# Patient Record
Sex: MALE | Race: WHITE | NOT HISPANIC OR LATINO
[De-identification: names, ages, dates, MRNs, and addresses within clinical notes are randomized per-mention and may not be internally consistent; named-entity substitution may affect disease eponyms.]

---

## 2023-03-30 PROBLEM — Z00.00 ENCOUNTER FOR PREVENTIVE HEALTH EXAMINATION: Status: ACTIVE | Noted: 2023-03-30

## 2023-03-30 PROBLEM — Z00.00 ENCOUNTER FOR PREVENTIVE HEALTH EXAMINATION: Noted: 2023-03-30

## 2023-03-31 ENCOUNTER — APPOINTMENT (OUTPATIENT)
Dept: ORTHOPEDIC SURGERY | Facility: CLINIC | Age: 48
End: 2023-03-31
Payer: OTHER MISCELLANEOUS

## 2023-03-31 VITALS — HEIGHT: 72 IN | WEIGHT: 220 LBS | BODY MASS INDEX: 29.8 KG/M2

## 2023-03-31 DIAGNOSIS — Z78.9 OTHER SPECIFIED HEALTH STATUS: ICD-10-CM

## 2023-03-31 DIAGNOSIS — J45.909 UNSPECIFIED ASTHMA, UNCOMPLICATED: ICD-10-CM

## 2023-03-31 DIAGNOSIS — M51.26 OTHER INTERVERTEBRAL DISC DISPLACEMENT, LUMBAR REGION: ICD-10-CM

## 2023-03-31 DIAGNOSIS — M54.16 RADICULOPATHY, LUMBAR REGION: ICD-10-CM

## 2023-03-31 PROCEDURE — 99204 OFFICE O/P NEW MOD 45 MIN: CPT

## 2023-03-31 RX ORDER — IBUPROFEN 800 MG/1
800 TABLET, FILM COATED ORAL 3 TIMES DAILY
Qty: 90 | Refills: 1 | Status: ACTIVE | COMMUNITY
Start: 2023-03-31 | End: 1900-01-01

## 2023-03-31 NOTE — DATA REVIEWED
[MRI] : MRI [Lumbar Spine] : lumbar spine [Report was reviewed and noted in the chart] : The report was reviewed and noted in the chart [I independently reviewed and interpreted images and report] : I independently reviewed and interpreted images and report [I reviewed the films/CD and additionally noted] : I reviewed the films/CD and additionally noted [FreeTextEntry1] : 03/29/2023 MRI lumbar spine [facility] imagin\par \par L4-L5: Disc bulge with left foraminal-lateral annular tear, impinging upon\par the thecal sac, resulting in left neural foramen stenosis with facet\par arthrosis.\par L5-S1: Aforementioned retrolisthesis with an extruded central-right\par paracentral disc herniation superimposed upon a disc bulge, demonstrating\par approximately 0.5 cm inferior migration of disc material, partially effacing\par the ventral epidural fat, resulting in right lateral recess and right neural\par foramen stenosis with facet arthrosis, impinging upon the right S1 nerve\par roots.

## 2023-03-31 NOTE — HISTORY OF PRESENT ILLNESS
[de-identified] : 03/31/2023 - Patient is a 48-year-old male presenting with lumbar back pain radiating to his posterior right leg stopping at the right calf. Patient states this is part of a work injury from November 2022. 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  Patient denies significant back injury prior to this incident.  Patient responded well to conservative care in the past about one year ago, including chiropractic care physical therapy, and pain management. Over the past three weeks patient had a recent back pain flare up.  His pain is predominantly in his right posterior leg radiating down to his right calf. Patient proceeded to chiropractic care with no relief. Patient then proceeded to pain management for TPI and epidurals with minimal to no relief. Patient went to the ER two days ago at [facility]. Patient had MRI done. Patient was given pain meds, IV steroid, and muscle relaxer.  In today’s visit his pain level is tolerable is improved since his ER visit would like to try physical therapy.  He denies saddle numbness, b/b dysfunction, loss of function or intractable pain at this point.  He has not needed to take pain meds he is on a Medrol dose pack.\par \par \par DOI: 11/16/2022\par Occupation [employer]\par Status OOW x 3 weeks \par \par \par \par The patient is a 48 year old , R dominant hand male who presents today complaining of lower back,  \par Date of Injury/Onset: 11/06/2022.\par Pain:    At Rest: 3/10  \par With Activity:  5-6/10  \par Mechanism of injury:  No specific cause of injury. Pt states some overuse due to his job type.\par Quality of symptoms: Achy, some numbness in the right foot, radiates down to the calf.\par This is a Worker's Compensation Injury\par Improves with: Tylenol\par Worse with: Sitting, sleeping, standing \par Prior treatment/ Imaging: CSI ~ 2 weeks ago, Epidural injection on the following week\par Out of work: ;Since: \par School/Sport/Position/Occupation: [employer]\par Additional Information: Pt was in the hospital ([facility]) 2 days ago with severe spasms \par \par

## 2023-03-31 NOTE — PHYSICAL EXAM
[Flexion] : flexion [Extension] : extension [] : mildly antalgic [de-identified] : Constitutional:  \par - No acute distress  \par - Well developed; well nourished  \par   \par Neurological:  \par - normal mood and affect  \par - alert and oriented x 3   \par   \par Cardiovascular:  \par - grossly normal\par   \par \par Lumbar Spine Exam: \par \par Inspection: \par erythema (-) \par ecchymosis (-) \par rashes (-) \par alignment: no scoliosis \par   \par Palpation: \par Midline lumbar tenderness:             (-) \par midline thoracic tenderness:          (-) \par Lumbar paraspinal tenderness:  	L (-) ; R (+) \par thoracic paraspinal tenderness:	L (-) ; R (-) \par sciatic nerve tenderness :         	L (-) ; R (-) \par SI joint tenderness:                    	L (-) ; R (-) \par GTB tenderness:                        	L (-);  R (-) \par   \par ROM:   stifness in all planes\par pain with flexion in his right posterior leg\par   \par Strength: \par                                	Right   	Left    \par Hip Flexion:                (5/5)       (5/5) \par Quadriceps:               (5/5)       (5/5) \par Hamstrings:                (5/5)       (5/5) \par Ankle Dorsiflexion:    (5/5)       (5/5) \par EHL:                            (5/5)       (5/5) \par Ankle Plantarflexion:  (5/5)       (5/5) \par   \par Special Tests: \par SLR:                        	R (+) ; L (-) \par Facet loading:        	R (-) ; L (-) \par JULIUS test:            	R (-) ; L (-) \par Hamstring tightness:  R (-);  L (-) \par   \par Neurologic: \par SILT throughout right lower extremity \par SILT throughout left lower extremity \par   \par Reflexes normal and symmetric bilateral lower extremities \par   \par Gait: \par non- antalgic gait \par ambulates without assistive device \par \par

## 2023-03-31 NOTE — ASSESSMENT
[FreeTextEntry1] : Patient is a 48-year-old male with a disc herniation at L5 S1 on the right side causing S1 nerve compression.  Patient responded well to conservative measures about a year ago for the same disc herniation.  Patient new MRI has imaging from one year ago which shows the same disc herniation MRI from two days ago does not show significant change.  Patient can try physical therapy given his pain level is adequate at this point.  Patient can be a possible L5-S1 microdiscectomy  candidate if he fails conservative measures.  Patient reports he has taken Ibuprofen without reaction in the past and requests a prescription.FUV in 4 weeks.  \par \par \par Prior to appointment and during encounter with patient extensive medical records were reviewed including but not limited to, hospital records, outpatient records, imaging results, and lab data. During this appointment the patient was examined, diagnoses were discussed and explained in a face to face manner. In addition extensive time was spent reviewing aforementioned diagnostic studies. Counseling including abnormal image results, differential diagnoses, treatment options, risk and benefits, lifestyle changes, current condition, and current medications was performed. Patient's comments, questions, and concerns were addressed and patient verbalized understanding. Based on this patient's presentation at our office, which is an orthopedic spine surgeon's office, this patient inherently / intrinsically has a risk, however minute, of developing issues such as Cauda equina syndrome, bowel and bladder changes, or progression of motor or neurological deficits such as paralysis which may be permanent.\par \par Tim MORENO, personally performed the services described in this documentation incident to Mono Bhakta MD. All medical record entries are made by the scribe were at my direction and in my presence.\par

## 2023-04-21 ENCOUNTER — APPOINTMENT (OUTPATIENT)
Dept: ORTHOPEDIC SURGERY | Facility: CLINIC | Age: 48
End: 2023-04-21